# Patient Record
Sex: MALE | Race: OTHER | HISPANIC OR LATINO | Employment: UNEMPLOYED | URBAN - METROPOLITAN AREA
[De-identification: names, ages, dates, MRNs, and addresses within clinical notes are randomized per-mention and may not be internally consistent; named-entity substitution may affect disease eponyms.]

---

## 2022-11-22 ENCOUNTER — HOSPITAL ENCOUNTER (EMERGENCY)
Facility: HOSPITAL | Age: 37
End: 2022-11-23
Attending: EMERGENCY MEDICINE

## 2022-11-22 DIAGNOSIS — F30.2 BIPOLAR I DISORDER, SINGLE MANIC EPISODE, SEVERE, WITH PSYCHOSIS (HCC): Primary | ICD-10-CM

## 2022-11-22 LAB
AMPHETAMINES SERPL QL SCN: NEGATIVE
BARBITURATES UR QL: NEGATIVE
BENZODIAZ UR QL: NEGATIVE
COCAINE UR QL: NEGATIVE
ETHANOL SERPL-MCNC: <10 MG/DL (ref 0–10)
FLUAV RNA RESP QL NAA+PROBE: NEGATIVE
FLUBV RNA RESP QL NAA+PROBE: NEGATIVE
METHADONE UR QL: NEGATIVE
OPIATES UR QL SCN: NEGATIVE
OXYCODONE+OXYMORPHONE UR QL SCN: NEGATIVE
PCP UR QL: NEGATIVE
RSV RNA RESP QL NAA+PROBE: NEGATIVE
SARS-COV-2 RNA RESP QL NAA+PROBE: NEGATIVE
THC UR QL: POSITIVE

## 2022-11-22 RX ORDER — BENZTROPINE MESYLATE 1 MG/ML
INJECTION INTRAMUSCULAR; INTRAVENOUS
Status: COMPLETED
Start: 2022-11-22 | End: 2022-11-22

## 2022-11-22 RX ORDER — HALOPERIDOL 5 MG/ML
5 INJECTION INTRAMUSCULAR ONCE
Status: COMPLETED | OUTPATIENT
Start: 2022-11-22 | End: 2022-11-22

## 2022-11-22 RX ORDER — HALOPERIDOL 5 MG/ML
INJECTION INTRAMUSCULAR
Status: COMPLETED
Start: 2022-11-22 | End: 2022-11-22

## 2022-11-22 RX ORDER — LORAZEPAM 2 MG/ML
INJECTION INTRAMUSCULAR
Status: COMPLETED
Start: 2022-11-22 | End: 2022-11-22

## 2022-11-22 RX ORDER — LORAZEPAM 2 MG/ML
2 INJECTION INTRAMUSCULAR ONCE
Status: COMPLETED | OUTPATIENT
Start: 2022-11-22 | End: 2022-11-22

## 2022-11-22 RX ORDER — BENZTROPINE MESYLATE 1 MG/ML
2 INJECTION INTRAMUSCULAR; INTRAVENOUS ONCE
Status: COMPLETED | OUTPATIENT
Start: 2022-11-22 | End: 2022-11-22

## 2022-11-22 RX ADMIN — BENZTROPINE MESYLATE 2 MG: 1 INJECTION INTRAMUSCULAR; INTRAVENOUS at 15:45

## 2022-11-22 RX ADMIN — LORAZEPAM 2 MG: 2 INJECTION INTRAMUSCULAR; INTRAVENOUS at 15:45

## 2022-11-22 RX ADMIN — HALOPERIDOL 5 MG: 5 INJECTION INTRAMUSCULAR at 15:45

## 2022-11-22 RX ADMIN — HALOPERIDOL LACTATE 5 MG: 5 INJECTION, SOLUTION INTRAMUSCULAR at 15:45

## 2022-11-22 RX ADMIN — LORAZEPAM 2 MG: 2 INJECTION INTRAMUSCULAR at 15:45

## 2022-11-22 NOTE — ED PROVIDER NOTES
History  Chief Complaint   Patient presents with   • Psychiatric Evaluation     Pt arrives with Police and [facility], was 302'd by sister for bizarre behaviors  Denies SI  Patient is a 70-year-old male who presents via [address] with a petitioned 302  Increasingly bizarre behavior  Going around acting and thinking that he owns the building he lives in  Doesn't believe he has any issues  Hoahaoism preoccupation  H/o bipolar  No currently on any meds  Denies any other drug use  Denies any Si/Hi/halluciations  Doesn't think there is any issues and doesn't understand why he is here  Denies any MH history  Patient presents with a cut to his right thumb  Claiming that he is is best healer and refusing any treatment  None       No past medical history on file  No past surgical history on file  No family history on file  I have reviewed and agree with the history as documented  No existing history information found  No existing history information found  Review of Systems   Unable to perform ROS: Psychiatric disorder       Physical Exam  Physical Exam  Vitals and nursing note reviewed  Constitutional:       Appearance: Normal appearance  He is normal weight  HENT:      Head: Normocephalic and atraumatic  Cardiovascular:      Rate and Rhythm: Normal rate and regular rhythm  Pulses: Normal pulses  Heart sounds: Normal heart sounds  Pulmonary:      Effort: Pulmonary effort is normal       Breath sounds: Normal breath sounds  Abdominal:      General: Bowel sounds are normal       Palpations: Abdomen is soft  Musculoskeletal:      Cervical back: Normal range of motion and neck supple  Skin:     General: Skin is warm  Capillary Refill: Capillary refill takes less than 2 seconds  Comments: 1 5 cm vertical incision to right thumb between DIP and IPJ  Neurological:      General: No focal deficit present        Mental Status: He is alert and oriented to person, place, and time  Psychiatric:         Attention and Perception: He is inattentive  Mood and Affect: Affect is labile  Speech: Speech is rapid and pressured  Thought Content: Thought content is delusional          Judgment: Judgment is impulsive and inappropriate           Vital Signs  ED Triage Vitals [11/22/22 1608]   Temp Pulse Respirations Blood Pressure SpO2   -- 72 20 157/72 98 %      Temp src Heart Rate Source Patient Position - Orthostatic VS BP Location FiO2 (%)   -- Monitor Sitting Left arm --      Pain Score       No Pain           Vitals:    11/22/22 1608   BP: 157/72   Pulse: 72   Patient Position - Orthostatic VS: Sitting         Visual Acuity      ED Medications  Medications   haloperidol lactate (HALDOL) injection 5 mg (5 mg Intramuscular Given 11/22/22 1545)   LORazepam (ATIVAN) injection 2 mg (2 mg Intramuscular Given 11/22/22 1545)   benztropine (COGENTIN) injection 2 mg (2 mg Intramuscular Given 11/22/22 1545)       Diagnostic Studies  Results Reviewed     Procedure Component Value Units Date/Time    FLU/RSV/COVID - if FLU/RSV clinically relevant [[id number]]     Lab Status: No result Specimen: Nares from Nose     Rapid drug screen, urine [[id number]]     Lab Status: No result Specimen: Urine     Ethanol [[id number]]     Lab Status: No result Specimen: Blood                  No orders to display              Procedures  Procedures         ED Course                                             MDM    Disposition  Final diagnoses:   Bipolar I disorder, single manic episode, severe, with psychosis ([facility] [address] )     Time reflects when diagnosis was documented in both MDM as applicable and the Disposition within this note     Time User Action Codes Description Comment    11/22/2022  3:37 PM Khushboo Hendrickson Add [F30 2] Bipolar I disorder, single manic episode, severe, with psychosis [facility])       ED Disposition     ED Disposition   Transfer to [facility] Condition   --    Date/Time   Tue Nov 22, 2022  3:37 PM    Comment   Diyafrjai Stage should be transferred out to TBD and has been medically cleared  Follow-up Information    None         Patient's Medications    No medications on file       No discharge procedures on file      PDMP Review     None          ED Provider  Electronically Signed by           Nikki Cullen MD  11/22/22 6357

## 2022-11-22 NOTE — ED NOTES
Contacted [address] [facility] about [phone] - confirmed with Jocelyne Mcneil that it was received

## 2022-11-23 VITALS
SYSTOLIC BLOOD PRESSURE: 142 MMHG | TEMPERATURE: 98.2 F | DIASTOLIC BLOOD PRESSURE: 72 MMHG | RESPIRATION RATE: 20 BRPM | WEIGHT: 160 LBS | HEART RATE: 82 BPM | OXYGEN SATURATION: 100 %

## 2022-11-23 NOTE — ED NOTES
Bed Search initiated at this time:    Orysia Meckel [facility]) possible discharge beds  Nancie Alston ([facility]) possible discharge beds  Arianne Huerta) possible discharge beds  Paul Dangelo) possible discharge beds  BODØ ([facility]) potential bed available  Ana Marie) potential bed available  Martinez BRADSHAW [facility]) cannot accommodate a 302 unless it is dated 11/23      302 sent to Intake for when a bed becomes available  Clinical faxed to Orysia Meckel, [address] for review  Bed search to continue if needed

## 2022-11-23 NOTE — ED NOTES
Rui Suicide Risk Assessment deferred, as unable to assess while patient sleeping  Behavioral Health Assessment deferred as patient is sleeping and would benefit from additional rest   Vital signs deferred until patient awake, no signs or symptoms of respiratory distress at this time  Once patient is awake and able to participate, will complete assessments         Anshul Calle RN  11/23/22 0578

## 2022-11-23 NOTE — ED NOTES
Transport requested through [facility]  [facility] admissions and Intake to be updated on transport information once arranged

## 2022-11-23 NOTE — ED NOTES
ED tech did attempt to have patient do an alcohol breathalizer, patient like did not understand what he was supposed to do with the Trini Clos, Dr Anne Marie Ulloa made aware of the same and then ordered a blood alcohol level  Prior to the blood alcohol level draw patient did get OOB and voided, urine sample collected       Rowan Bernstein RN  11/22/22 3877

## 2022-11-23 NOTE — ED NOTES
Pt presented to the ED with APD/EMS with a 302 petitioned by [facility] worker Arlette  Pt was initially medicated and unable to understand voluntary treatment rights  Per 302 petitioned to Arlette from a family member, he has been acting more erraticly and believes he owns the building he resides in  He is not currently taking any of his prescribed medications or involved in any outpatient mental health services  he has been increasingly manic and grandiose at work  Pt believes that he created the beautiful day for others  pt referred to himself as a diety to [address] [place] [address]  Pt does not fully participate in crisis evaluation in the ED to obtain additional information at this time  he exibited poor insight upon initial evaluation and does not believe that the physician is a real physician  He denies a prior mental health history of any kind  Pt does appear to understand that he is being involuntary committed at this time  for treatment

## 2022-11-23 NOTE — EMTALA/ACUTE CARE TRANSFER
[facility] EMERGENCY DEPARTMENT  [address] [facility] [address]  [phone]  Dept: [address] [facility] CONSENT    NAME Ana Lilia Oconnell                                         1985                              MRN [id number]    I have been informed of my rights regarding examination, treatment, and transfer   by Dr Nigel Dinero DO    Benefits: Continuity of care    Risks: Potential for delay in receiving treatment      Consent for Transfer:  I acknowledge that my medical condition has been evaluated and explained to me by the emergency department physician or other qualified medical person and/or my attending physician, who has recommended that I be transferred to the service of  Accepting Physician: Dr Esha Belle at [address] [place] [address] Name, [address] : Elma Rector, [place]  The above potential benefits of such transfer, the potential risks associated with such transfer, and the probable risks of not being transferred have been explained to me, and I fully understand them  The doctor has explained that, in my case, the benefits of transfer outweigh the risks  I agree to be transferred  I authorize the performance of emergency medical procedures and treatments upon me in both transit and upon arrival at the receiving facility  Additionally, I authorize the release of any and all medical records to the receiving facility and request they be transported with me, if possible  I understand that the safest mode of transportation during a medical emergency is an ambulance and that the Hospital advocates the use of this mode of transport  Risks of traveling to the receiving facility by car, including absence of medical control, life sustaining equipment, such as oxygen, and medical personnel has been explained to me and I fully understand them      (NOREEN CORRECT BOX BELOW)  [  ]  I consent to the stated transfer and to be transported by ambulance/helicopter  [  ]  I consent to the stated transfer, but refuse transportation by ambulance and accept full responsibility for my transportation by car  I understand the risks of non-ambulance transfers and I exonerate the Hospital and its staff from any deterioration in my condition that results from this refusal     X___________________________________________    DATE  22  TIME________  Signature of patient or legally responsible individual signing on patient behalf           RELATIONSHIP TO PATIENT_________________________          Provider Certification    NAME Mague Grimm                                         1985                              MRN [id number]    A medical screening exam was performed on the above named patient  Based on the examination:    Condition Necessitating Transfer The encounter diagnosis was Bipolar I disorder, single manic episode, severe, with psychosis ([facility] [address] )  Patient Condition: The patient has been stabilized such that within reasonable medical probability, no material deterioration of the patient condition or the condition of the unborn child(lisa) is likely to result from the transfer    Reason for Transfer: No bed available at level of patient's needs    Transfer Requirements: [place], [place]   · Space available and qualified personnel available for treatment as acknowledged by Wilbert Cedillo ; [phone]  · Agreed to accept transfer and to provide appropriate medical treatment as acknowledged by       Dr Gay Mcgovern  · Appropriate medical records of the examination and treatment of the patient are provided at the time of transfer   [facility], [address] _______  · Transfer will be performed by qualified personnel from    and appropriate transfer equipment as required, including the use of necessary and appropriate life support measures      Provider Certification: I have examined the patient and explained the following risks and benefits of being transferred/refusing transfer to the patient/family:  General risk, such as traffic hazards, adverse weather conditions, rough terrain or turbulence, possible failure of equipment (including vehicle or aircraft), or consequences of actions of persons outside the control of the transport personnel      Based on these reasonable risks and benefits to the patient and/or the unborn child(lisa), and based upon the information available at the time of the patient’s examination, I certify that the medical benefits reasonably to be expected from the provision of appropriate medical treatments at another medical facility outweigh the increasing risks, if any, to the individual’s medical condition, and in the case of labor to the unborn child, from effecting the transfer      X____________________________________________ DATE 11/23/22        TIME_______      ORIGINAL - SEND TO MEDICAL RECORDS   COPY - SEND WITH PATIENT DURING TRANSFER

## 2022-11-23 NOTE — ED NOTES
Insurance Authorization for admission:   Phone call placed to [facility]  Phone number: [phone]  Spoke to [employer]      6 days approved  Level of care: Inpatient Psych 302  Review on 11/28  Authorization # upon arrival to treating facility

## 2022-11-23 NOTE — ED NOTES
Crisis worker made aware patient is awake and she can potentially do her crisis evaluation for this patient       Rigoberto Hernandez RN  11/22/22 5776

## 2022-11-23 NOTE — ED NOTES
Pt resting on stretcher with no signs of distress or discomfort  Resp even and unlabored  This nurse to continue monitoring  Pt under 1:1 observation  [facility] Suicide Risk Assessment deferred, as unable to assess while patient sleeping  Behavioral Health Assessment deferred as patient is sleeping and would benefit from additional rest   Vital signs deferred until patient awake, no signs or symptoms of respiratory distress at this time  Once patient is awake and able to participate, will complete assessments         Jakob Davis RN  11/22/22 2019       Jakob Davis RN  11/22/22 2020